# Patient Record
Sex: MALE | Employment: FULL TIME | URBAN - METROPOLITAN AREA
[De-identification: names, ages, dates, MRNs, and addresses within clinical notes are randomized per-mention and may not be internally consistent; named-entity substitution may affect disease eponyms.]

---

## 2017-04-24 ENCOUNTER — TELEPHONE (OUTPATIENT)
Dept: INTERNAL MEDICINE CLINIC | Facility: CLINIC | Age: 37
End: 2017-04-24

## 2017-04-24 ENCOUNTER — HOSPITAL ENCOUNTER (OUTPATIENT)
Dept: GENERAL RADIOLOGY | Facility: HOSPITAL | Age: 37
Discharge: HOME | End: 2017-04-24
Attending: INTERNAL MEDICINE
Payer: COMMERCIAL

## 2017-04-24 ENCOUNTER — OFFICE VISIT (OUTPATIENT)
Dept: INTERNAL MEDICINE CLINIC | Facility: CLINIC | Age: 37
End: 2017-04-24

## 2017-04-24 VITALS
OXYGEN SATURATION: 96 % | HEIGHT: 74 IN | SYSTOLIC BLOOD PRESSURE: 129 MMHG | DIASTOLIC BLOOD PRESSURE: 75 MMHG | TEMPERATURE: 98 F | HEART RATE: 68 BPM | BODY MASS INDEX: 35.68 KG/M2 | WEIGHT: 278 LBS

## 2017-04-24 DIAGNOSIS — R05.3 CHRONIC COUGH: ICD-10-CM

## 2017-04-24 DIAGNOSIS — R05.3 CHRONIC COUGH: Primary | ICD-10-CM

## 2017-04-24 PROCEDURE — 99212 OFFICE O/P EST SF 10 MIN: CPT | Performed by: INTERNAL MEDICINE

## 2017-04-24 PROCEDURE — 71020 XR CHEST PA + LAT CHEST (CPT=71020): CPT

## 2017-04-24 PROCEDURE — 99214 OFFICE O/P EST MOD 30 MIN: CPT | Performed by: INTERNAL MEDICINE

## 2017-04-24 NOTE — PROGRESS NOTES
HPI:    Patient ID: Lisseth Mann is a 43year old male. HPI  Cough  This is a chronic problem. Episode onset: 3 months. The problem has been unchanged. The cough is productive of sputum (clear). Associated symptoms include postnasal drip.  Pertinent nega and no frontal sinus tenderness. Mouth/Throat: Oropharynx is clear and moist. Uvula swelling present. No oropharyngeal exudate or posterior oropharyngeal erythema. Eyes: Conjunctivae are normal.   Neck: Normal range of motion.    Cardiovascular: Normal my name below, I, [address],  attest that this documentation has been prepared under the direction and in the presence of Aishwarya Lilly MD.   Electronically Signed: [address], 4/24/2017, 8:02 AM.    I, Aishwarya Lilly MD,  personally performed th

## 2017-04-25 RX ORDER — BENZONATATE 200 MG/1
200 CAPSULE ORAL 3 TIMES DAILY PRN
Qty: 30 CAPSULE | Refills: 2 | Status: SHIPPED | OUTPATIENT
Start: 2017-04-25 | End: 2021-12-01 | Stop reason: ALTCHOICE

## 2017-04-25 NOTE — TELEPHONE ENCOUNTER
Sent to the patient's pharmacy Tessalon pears (generic version). Call the patient and relay the information.  Thanks

## 2017-04-25 NOTE — TELEPHONE ENCOUNTER
Dr. Faisal Heck, please see message below. Thank you. Spoke to pt. Informed pt of Dr. Ramiro Mas message regarding chest x-ray. Pt is inquiring if rx can be sent to pharmacy to help with cough (pharmacy verified).  Informed message would be sent to Dr. Christy Almeida

## 2017-04-26 ENCOUNTER — HOSPITAL ENCOUNTER (OUTPATIENT)
Dept: RESPIRATORY THERAPY | Facility: HOSPITAL | Age: 37
Discharge: HOME | End: 2017-04-26
Attending: INTERNAL MEDICINE
Payer: COMMERCIAL

## 2017-04-26 DIAGNOSIS — R05.3 CHRONIC COUGH: ICD-10-CM

## 2017-04-26 PROCEDURE — 94060 EVALUATION OF WHEEZING: CPT | Performed by: INTERNAL MEDICINE

## 2017-04-26 PROCEDURE — 94726 PLETHYSMOGRAPHY LUNG VOLUMES: CPT | Performed by: INTERNAL MEDICINE

## 2017-04-26 PROCEDURE — 94729 DIFFUSING CAPACITY: CPT | Performed by: INTERNAL MEDICINE

## 2017-04-26 NOTE — TELEPHONE ENCOUNTER
Pt contacted. Informed that rx was sent to pharmacy. Verbalized understanding. No further questions.

## 2017-05-02 NOTE — H&P
Pulmonary Function Test     Samy Rodrigez Patient Status:  Outpatient    1980 MRN [id number]   Date of Exam  2017 PCP Barbra Ivory MD           Spirometry   FEV1:    3.45 L (  71 % )     FEV1/FVC:  78 % - WNL   No significant BDR     Lung V

## 2017-05-02 NOTE — ADDENDUM NOTE
Encounter addended by: Pola Rogers MD on: 5/1/2017  8:37 PM<BR>     Documentation filed: Charges VN, Notes Section

## 2021-12-01 ENCOUNTER — OFFICE VISIT (OUTPATIENT)
Dept: INTERNAL MEDICINE CLINIC | Facility: CLINIC | Age: 41
End: 2021-12-01
Payer: COMMERCIAL

## 2021-12-01 VITALS
DIASTOLIC BLOOD PRESSURE: 86 MMHG | OXYGEN SATURATION: 97 % | HEIGHT: 74 IN | HEART RATE: 64 BPM | SYSTOLIC BLOOD PRESSURE: 124 MMHG | BODY MASS INDEX: 38.22 KG/M2 | WEIGHT: 297.81 LBS

## 2021-12-01 DIAGNOSIS — E66.9 OBESITY (BMI 30-39.9): ICD-10-CM

## 2021-12-01 DIAGNOSIS — R68.82 LOW LIBIDO: ICD-10-CM

## 2021-12-01 DIAGNOSIS — F17.290 CIGAR SMOKER: ICD-10-CM

## 2021-12-01 DIAGNOSIS — Z00.00 HEALTH MAINTENANCE EXAMINATION: Primary | ICD-10-CM

## 2021-12-01 DIAGNOSIS — E78.5 DYSLIPIDEMIA: ICD-10-CM

## 2021-12-01 DIAGNOSIS — Z78.9 PATIENT IS JEHOVAH'S WITNESS: ICD-10-CM

## 2021-12-01 DIAGNOSIS — N52.9 ERECTILE DYSFUNCTION, UNSPECIFIED ERECTILE DYSFUNCTION TYPE: ICD-10-CM

## 2021-12-01 DIAGNOSIS — R20.0 NUMBNESS: ICD-10-CM

## 2021-12-01 PROBLEM — IMO0001 PATIENT IS JEHOVAH'S WITNESS: Status: ACTIVE | Noted: 2021-12-01

## 2021-12-01 PROCEDURE — 3074F SYST BP LT 130 MM HG: CPT | Performed by: FAMILY MEDICINE

## 2021-12-01 PROCEDURE — 99386 PREV VISIT NEW AGE 40-64: CPT | Performed by: FAMILY MEDICINE

## 2021-12-01 PROCEDURE — 99406 BEHAV CHNG SMOKING 3-10 MIN: CPT | Performed by: FAMILY MEDICINE

## 2021-12-01 PROCEDURE — 90715 TDAP VACCINE 7 YRS/> IM: CPT | Performed by: FAMILY MEDICINE

## 2021-12-01 PROCEDURE — 96127 BRIEF EMOTIONAL/BEHAV ASSMT: CPT | Performed by: FAMILY MEDICINE

## 2021-12-01 PROCEDURE — 3079F DIAST BP 80-89 MM HG: CPT | Performed by: FAMILY MEDICINE

## 2021-12-01 PROCEDURE — 90686 IIV4 VACC NO PRSV 0.5 ML IM: CPT | Performed by: FAMILY MEDICINE

## 2021-12-01 PROCEDURE — 3008F BODY MASS INDEX DOCD: CPT | Performed by: FAMILY MEDICINE

## 2021-12-01 PROCEDURE — 90471 IMMUNIZATION ADMIN: CPT | Performed by: FAMILY MEDICINE

## 2021-12-01 PROCEDURE — 90472 IMMUNIZATION ADMIN EACH ADD: CPT | Performed by: FAMILY MEDICINE

## 2021-12-01 RX ORDER — TADALAFIL 5 MG/1
5 TABLET ORAL
Qty: 10 TABLET | Refills: 0 | Status: SHIPPED | OUTPATIENT
Start: 2021-12-01

## 2021-12-01 NOTE — PATIENT INSTRUCTIONS
PATIENT INSTRUCTIONS    • Thank you for seeing me today, it was a pleasure taking care of you. • Please check out at the  and schedule a follow up appointment. Return in about 3 months (around 3/1/2022) for follow up.   • Please get your labs dr

## 2021-12-01 NOTE — ASSESSMENT & PLAN NOTE
Numbness in the first 3 digits of the hand in the region of the median nerve. Patient with persistent numbness. No significant Phalen's or Tinel's sign as patient with persistent numbness. EMG ordered. We will check B12, TSH levels.   Can consider refer

## 2021-12-01 NOTE — ASSESSMENT & PLAN NOTE
Erectile dysfunction with low libido. Advised weight loss with healthy diet and exercise. We will check testosterone levels. Cialis prescribed.

## 2021-12-01 NOTE — PROGRESS NOTES
FAMILY MEDICINE CLINIC NOTE    HPI  Lissteh Mann is a 41year old male presenting for physical    #Health Maintenance  -Diet: Bad - he eats everything.   -Exercise: No exercise  -Lung cancer screen: Not indicated  -Colon cancer screen: Not indicated  -Pros ACTIVE PROBLEM  Patient Active Problem List:     Patient is [employer]     Obesity (BMI 30-39. 9)     Dyslipidemia     Numbness     Health maintenance examination     Erectile dysfunction     Cigar smoker     Low libido      PAST MEDICAL HISTOR surgical history on file.     PAST FAMILY HISTORY  Family History   Problem Relation Age of Onset   • Traumatic brain injury Father    • Breast Cancer Mother    • No Known Problems Brother    • No Known Problems Maternal Grandmother          in 46s   • DIAGNOSTICS    ASSESSMENT/PLAN  Problem List Items Addressed This Visit        Respiratory    Cigar smoker     Advised cigar smoking cessation.          Relevant Orders    BEHAV CHNG SMOKING GR THAN 3 UP TO 10 MIN       Neurologic    Numbness     Numbne MD  Family Medicine              Tobacco Cessation Documentation (Smoking and Smokeless included):    I had an in depth therapy session with Shruti Almaguer about his tobacco use risks and options using the [facility]'s Five A's approach:    Ask: Rosibel Conn is using

## 2021-12-01 NOTE — PROGRESS NOTES
Pt presented to clinic today for blood draw. Per physician able to draw orders. Orders  documented within chart. Pt tolerated lab draw well.  verified.   Orders drawn include:   CBC WITH DIFFERENTIAL WITH PLATELET   COMP METABOLIC PANEL (14)   HEMOGLOBIN

## 2021-12-04 ENCOUNTER — NURSE ONLY (OUTPATIENT)
Dept: INTERNAL MEDICINE CLINIC | Facility: CLINIC | Age: 41
End: 2021-12-04
Payer: COMMERCIAL

## 2021-12-04 DIAGNOSIS — R68.82 LOW LIBIDO: ICD-10-CM

## 2021-12-04 DIAGNOSIS — N52.9 ERECTILE DYSFUNCTION, UNSPECIFIED ERECTILE DYSFUNCTION TYPE: ICD-10-CM

## 2021-12-08 PROBLEM — E78.2 MIXED HYPERLIPIDEMIA: Status: ACTIVE | Noted: 2021-12-01

## 2025-06-10 ENCOUNTER — OFFICE VISIT (OUTPATIENT)
Dept: INTERNAL MEDICINE CLINIC | Facility: CLINIC | Age: 45
End: 2025-06-10

## 2025-06-10 ENCOUNTER — HOSPITAL ENCOUNTER (OUTPATIENT)
Dept: GENERAL RADIOLOGY | Facility: HOSPITAL | Age: 45
Discharge: HOME | End: 2025-06-10
Attending: INTERNAL MEDICINE
Payer: COMMERCIAL

## 2025-06-10 ENCOUNTER — LAB ENCOUNTER (OUTPATIENT)
Dept: LAB | Age: 45
End: 2025-06-10
Attending: INTERNAL MEDICINE
Payer: COMMERCIAL

## 2025-06-10 VITALS
SYSTOLIC BLOOD PRESSURE: 126 MMHG | WEIGHT: 295.63 LBS | BODY MASS INDEX: 38.35 KG/M2 | OXYGEN SATURATION: 96 % | RESPIRATION RATE: 20 BRPM | DIASTOLIC BLOOD PRESSURE: 82 MMHG | HEIGHT: 73.5 IN | TEMPERATURE: 98 F | HEART RATE: 76 BPM

## 2025-06-10 DIAGNOSIS — E78.00 PURE HYPERCHOLESTEROLEMIA: ICD-10-CM

## 2025-06-10 DIAGNOSIS — Z00.00 ANNUAL PHYSICAL EXAM: Primary | ICD-10-CM

## 2025-06-10 DIAGNOSIS — N52.9 ERECTILE DYSFUNCTION, UNSPECIFIED ERECTILE DYSFUNCTION TYPE: ICD-10-CM

## 2025-06-10 DIAGNOSIS — M25.512 CHRONIC LEFT SHOULDER PAIN: ICD-10-CM

## 2025-06-10 DIAGNOSIS — G89.29 CHRONIC LEFT SHOULDER PAIN: ICD-10-CM

## 2025-06-10 DIAGNOSIS — Z12.11 COLON CANCER SCREENING: ICD-10-CM

## 2025-06-10 DIAGNOSIS — R05.3 CHRONIC COUGH: ICD-10-CM

## 2025-06-10 DIAGNOSIS — E66.09 CLASS 2 OBESITY DUE TO EXCESS CALORIES WITHOUT SERIOUS COMORBIDITY WITH BODY MASS INDEX (BMI) OF 38.0 TO 38.9 IN ADULT: ICD-10-CM

## 2025-06-10 DIAGNOSIS — E66.812 CLASS 2 OBESITY DUE TO EXCESS CALORIES WITHOUT SERIOUS COMORBIDITY WITH BODY MASS INDEX (BMI) OF 38.0 TO 38.9 IN ADULT: ICD-10-CM

## 2025-06-10 DIAGNOSIS — Z80.0 FAMILY HISTORY OF COLON CANCER: ICD-10-CM

## 2025-06-10 DIAGNOSIS — Z00.00 ANNUAL PHYSICAL EXAM: ICD-10-CM

## 2025-06-10 DIAGNOSIS — R20.0 NUMBNESS AND TINGLING IN LEFT HAND: ICD-10-CM

## 2025-06-10 DIAGNOSIS — R20.2 NUMBNESS AND TINGLING IN LEFT HAND: ICD-10-CM

## 2025-06-10 PROBLEM — R68.82 LOW LIBIDO: Status: RESOLVED | Noted: 2021-12-01 | Resolved: 2025-06-10

## 2025-06-10 PROBLEM — E66.9 OBESITY (BMI 30-39.9): Status: RESOLVED | Noted: 2021-12-01 | Resolved: 2025-06-10

## 2025-06-10 PROBLEM — E78.2 MIXED HYPERLIPIDEMIA: Status: RESOLVED | Noted: 2021-12-01 | Resolved: 2025-06-10

## 2025-06-10 LAB
ALBUMIN SERPL-MCNC: 4.7 G/DL (ref 3.2–4.8)
ALBUMIN/GLOB SERPL: 1.6 {RATIO} (ref 1–2)
ALP LIVER SERPL-CCNC: 86 U/L (ref 45–117)
ALT SERPL-CCNC: 25 U/L (ref 10–49)
ANION GAP SERPL CALC-SCNC: 9 MMOL/L (ref 0–18)
AST SERPL-CCNC: 24 U/L (ref ?–34)
BASOPHILS # BLD AUTO: 0.06 X10(3) UL (ref 0–0.2)
BASOPHILS NFR BLD AUTO: 0.6 %
BILIRUB SERPL-MCNC: 0.8 MG/DL (ref 0.3–1.2)
BUN BLD-MCNC: 15 MG/DL (ref 9–23)
BUN/CREAT SERPL: 14.7 (ref 10–20)
CALCIUM BLD-MCNC: 9.7 MG/DL (ref 8.7–10.4)
CHLORIDE SERPL-SCNC: 100 MMOL/L (ref 98–112)
CHOLEST SERPL-MCNC: 233 MG/DL (ref ?–200)
CO2 SERPL-SCNC: 29 MMOL/L (ref 21–32)
CREAT BLD-MCNC: 1.02 MG/DL (ref 0.7–1.3)
DEPRECATED RDW RBC AUTO: 39.4 FL (ref 35.1–46.3)
EGFRCR SERPLBLD CKD-EPI 2021: 92 ML/MIN/1.73M2 (ref 60–?)
EOSINOPHIL # BLD AUTO: 0.19 X10(3) UL (ref 0–0.7)
EOSINOPHIL NFR BLD AUTO: 1.9 %
ERYTHROCYTE [DISTWIDTH] IN BLOOD BY AUTOMATED COUNT: 11.9 % (ref 11–15)
EST. AVERAGE GLUCOSE BLD GHB EST-MCNC: 117 MG/DL (ref 68–126)
FASTING PATIENT LIPID ANSWER: NO
FASTING STATUS PATIENT QL REPORTED: NO
GLOBULIN PLAS-MCNC: 3 G/DL (ref 2–3.5)
GLUCOSE BLD-MCNC: 83 MG/DL (ref 70–99)
HBA1C MFR BLD: 5.7 % (ref ?–5.7)
HCT VFR BLD AUTO: 41.2 % (ref 39–53)
HDLC SERPL-MCNC: 47 MG/DL (ref 40–59)
HGB BLD-MCNC: 13.6 G/DL (ref 13–17.5)
IMM GRANULOCYTES # BLD AUTO: 0.04 X10(3) UL (ref 0–1)
IMM GRANULOCYTES NFR BLD: 0.4 %
LDLC SERPL CALC-MCNC: 153 MG/DL (ref ?–100)
LYMPHOCYTES # BLD AUTO: 1.84 X10(3) UL (ref 1–4)
LYMPHOCYTES NFR BLD AUTO: 18.4 %
MCH RBC QN AUTO: 29.6 PG (ref 26–34)
MCHC RBC AUTO-ENTMCNC: 33 G/DL (ref 31–37)
MCV RBC AUTO: 89.8 FL (ref 80–100)
MONOCYTES # BLD AUTO: 0.84 X10(3) UL (ref 0.1–1)
MONOCYTES NFR BLD AUTO: 8.4 %
NEUTROPHILS # BLD AUTO: 7.01 X10 (3) UL (ref 1.5–7.7)
NEUTROPHILS # BLD AUTO: 7.01 X10(3) UL (ref 1.5–7.7)
NEUTROPHILS NFR BLD AUTO: 70.3 %
NONHDLC SERPL-MCNC: 186 MG/DL (ref ?–130)
OSMOLALITY SERPL CALC.SUM OF ELEC: 286 MOSM/KG (ref 275–295)
PLATELET # BLD AUTO: 228 10(3)UL (ref 150–450)
POTASSIUM SERPL-SCNC: 3.7 MMOL/L (ref 3.5–5.1)
PROT SERPL-MCNC: 7.7 G/DL (ref 5.7–8.2)
RBC # BLD AUTO: 4.59 X10(6)UL (ref 4.3–5.7)
SODIUM SERPL-SCNC: 138 MMOL/L (ref 136–145)
TRIGL SERPL-MCNC: 182 MG/DL (ref 30–149)
TSI SER-ACNC: 0.77 UIU/ML (ref 0.55–4.78)
VLDLC SERPL CALC-MCNC: 35 MG/DL (ref 0–30)
WBC # BLD AUTO: 10 X10(3) UL (ref 4–11)

## 2025-06-10 PROCEDURE — 36415 COLL VENOUS BLD VENIPUNCTURE: CPT

## 2025-06-10 PROCEDURE — 84443 ASSAY THYROID STIM HORMONE: CPT

## 2025-06-10 PROCEDURE — 80053 COMPREHEN METABOLIC PANEL: CPT

## 2025-06-10 PROCEDURE — 85025 COMPLETE CBC W/AUTO DIFF WBC: CPT

## 2025-06-10 PROCEDURE — 83036 HEMOGLOBIN GLYCOSYLATED A1C: CPT

## 2025-06-10 PROCEDURE — 80061 LIPID PANEL: CPT

## 2025-06-10 PROCEDURE — 73030 X-RAY EXAM OF SHOULDER: CPT | Performed by: INTERNAL MEDICINE

## 2025-06-10 PROCEDURE — 71046 X-RAY EXAM CHEST 2 VIEWS: CPT | Performed by: INTERNAL MEDICINE

## 2025-06-10 RX ORDER — TADALAFIL 5 MG/1
5 TABLET ORAL
Qty: 10 TABLET | Refills: 0 | Status: SHIPPED | OUTPATIENT
Start: 2025-06-10

## 2025-06-10 RX ORDER — OMEPRAZOLE 20 MG/1
20 CAPSULE, DELAYED RELEASE ORAL
Qty: 14 CAPSULE | Refills: 0 | Status: SHIPPED | OUTPATIENT
Start: 2025-06-10

## 2025-06-10 NOTE — PROGRESS NOTES
The following individual(s) verbally consented to be recorded using ambient AI listening technology and understand that they can each withdraw their consent to this listening technology at any point by asking the clinician to turn off or pause the recording:    Patient name: Terrell Valladares  Additional names:  n/a

## 2025-06-10 NOTE — PATIENT INSTRUCTIONS
Prevention Guidelines, Men Ages 40 to 49  Screening tests and vaccines are an important part of managing your health. A screening test is done to find possible disorders or diseases in people who don't have any symptoms. The goal is to find a disease early so lifestyle changes can be made and you can be watched more closely to reduce the risk of disease, or to detect it early enough to treat it most effectively. Screening tests are not considered diagnostic, but are used to determine if more testing is needed. Health counseling is essential, too. Below are guidelines for these, for men ages 40 to 49. Talk with your healthcare provider to make sure you’re up to date on what you need.  Screening Who needs it How often   Alcohol misuse All men in this age group At routine exams   Blood pressure All men in this age group Yearly checkup if your blood pressure reading is normal  Normal blood pressure is less than 120/80 mm Hg  If your blood pressure is higher than normal, follow the advice of your healthcare provider      Depression All men in this age group At routine exams   Type 2 diabetes or prediabetes All men beginning at age 45 and men  without symptoms at any age who are overweight or obese and have 1 or more other risk factors for diabetes At least every 3 years (yearly if blood sugar has begun to rise)   Type 2 diabetes All men with prediabetes Every year   Hepatitis C Men at increased risk for infection - talk with your healthcare provider At routine exams   High cholesterol or triglycerides All men ages 35 and older, and younger men at high risk for coronary artery disease At least every 5 years   HIV All men At routine exams   Obesity All men in this age group At routine exams   Prostate cancer Starting at age 45, talk to healthcare provider about risks and benefits of digital rectal exam (SERAFIN) and prostate-specific antigen (PSA) screening1 At routine exams   Syphilis Men at increased risk for infection -  talk with your healthcare provider At routine exams   Tuberculosis Men at increased risk for infection - talk with your healthcare provider Check with your healthcare provider   Vision All men in this age group Every 2 to 4 years if no risk factors for eye disease2   Vaccine Who needs it How often   Chickenpox (varicella) All men in this age group who have no record of this infection or vaccine 2 doses; the second dose should be given at least 4 weeks after the first dose   Hepatitis A Men at increased risk for infection - talk with your healthcare provider 2 doses given at least 6 months apart   Hepatitis B Men at increased risk for infection - talk with your healthcare provider 3 doses over 6 months; second dose should be given 1 month after the first dose; the third dose should be given at least 2 months after the second dose and at least 4 months after the first dose   Haemophilus influenzae Type B (HIB) Men at increased risk for infection - talk with your healthcare provider 1 to 3 doses   Influenza (flu) All men in this age group Once a year   Measles, mumps, rubella (MMR) All men in this age group who have no record of these infections or vaccines 1 or 2 doses   Meningococcal Men at increased risk for infection - talk with your healthcare provider 1 or more doses   Pneumococcal conjugate vaccine (PCV13) and pneumococcal polysaccharide vaccine (PPSV23) Men at increased risk for infection - talk with your healthcare provider PCV13: 1 dose ages 19 to 65 (protects against 13 types of pneumococcal bacteria)     PPSV23: 1 to 2 doses through age 64, or 1 dose at 65 or older (protects against 23 types of pneumococcal bacteria)      Tetanus/diphtheria/  pertussis (Td/Tdap) booster All men in this age group Td every 10 years, or a one-time dose of Tdap instead of a Td booster after age 18, then Td every 10 years   Counseling Who needs it How often   Diet and exercise Men who are overweight or obese When diagnosed, and  then at routine exams   Sexually transmitted infection prevention Men at increased risk for infection - talk with your healthcare provider At routine exams   Use of daily aspirin Men ages 45 to 79 at risk for cardiovascular health problems At routine exams   Use of tobacco and the health effects it can cause All men in this age group Every exam   [address] [place] [address] [facility]   [facility] Academy of Ophthalmology  Date Last Reviewed: 2/1/2017  © 3784-5606 [employer]. [address] [place] [address], [place] [address]. All rights reserved. This information is not intended as a substitute for professional medical care. Always follow your healthcare professional's instructions.

## 2025-06-10 NOTE — PROGRESS NOTES
Patient ID: Terrell Valladares is a 44 year old male.  Chief Complaint   Patient presents with    Physical    Shoulder Pain     Left shoulder pain    Numbness     Numbness in left hand and fingers        HISTORY OF PRESENT ILLNESS:   HPI  History of Present Illness  Terrell Valladares is a 44 year old male who presents for an annual physical exam and evaluation of left shoulder pain.    He has a history of left shoulder pain attributed to old wrestling injuries, which worsened about a month ago after waking up with increased discomfort. He is left-handed and has not had any prior workup, x-rays, or physical therapy for this issue.    He experiences numbness and tingling in his left arm, specifically affecting the first three fingers, which he describes as a radial nerve palsy. This symptom has been present for about two to three years, initially triggered by a wrestling injury where he experienced a stinger. The numbness and tingling are constant throughout the day, and he has not sought any prior treatment for this condition.    He has a persistent cough that has been ongoing for about a year, possibly since COVID. The cough is more pronounced in the mornings and nights and worsens in cold air. He denies any history of smoking cigarettes but smokes cigars occasionally, about three to four times a year. No acid reflux symptoms are reported.  However, he does eat a very spicy and fatty diet.    He has a history of high cholesterol noted in 2015, but he is unaware of any recent lab work to reassess this condition. He denies any history of diabetes, although his brother was recently diagnosed with diabetes.     He has a BMI of 38 and wants to explore weight loss options. He is currently not on any specific weight loss regimen.    He has used Cialis in the past for erectile dysfunction, which he found effective, but he has not used it recently.    Never had a colonoscopy.  Father's brother was diagnosed with colon cancer around  age 60.    Urgency yearly.  No recreational drugs.  Consumes 2-3 alcoholic beverages weekly.  Works as a .  He is about to be  and has 1 child.  No other family history of premature cardiac diseases or cancer.        Review of Systems  Ten point review of systems otherwise negative with the exception of HPI and assessment and plan.    MEDICAL HISTORY:     Past Medical History[1]    Past Surgical History[2]    Medications - Current[3]    Allergies:Allergies[4]    Social History     Socioeconomic History    Marital status:      Spouse name: Not on file    Number of children: Not on file    Years of education: Not on file    Highest education level: Not on file   Occupational History    Not on file   Tobacco Use    Smoking status: Never     Passive exposure: Never    Smokeless tobacco: Never   Vaping Use    Vaping status: Never Used   Substance and Sexual Activity    Alcohol use: Yes     Comment: occasionally weekends - 10-15 drinks    Drug use: Never    Sexual activity: Yes     Partners: Female     Birth control/protection: Hysterectomy   Other Topics Concern    Not on file   Social History Narrative    Relationships:  - Conicka    Children: Davonte - 4 y/o M    Pets: Cat    School: N/A    Work: Currently working for [employer] (flavors for food, drinks etc). 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 [facility]    Interests: Enjoys going out with friends - enjoys going to see games (combat sports), drinking. Pro wrestling.    Spiritual: Quaker. Does not desire blood transfusion products.      Social Drivers of Health     Food Insecurity: No Food Insecurity (6/10/2025)    NCSS - Food Insecurity     Worried About Running Out of Food in the Last Year: No     Ran Out of Food in the Last Year: No   Transportation Needs: No Transportation Needs (6/10/2025)    NCSS - Transportation      Lack of Transportation: No   Stress: Not on file   Housing Stability: At Risk (6/10/2025)    NCSS - Housing/Utilities     Has Housing: Yes     Worried About Losing Housing: Yes     Unable to Get Utilities: No           PHYSICAL EXAM:     Vitals:    06/10/25 1452   BP: 126/82   Pulse: 76   Resp: 20   Temp: 98 °F (36.7 °C)   TempSrc: Temporal   SpO2: 96%   Weight: 295 lb 9.6 oz (134.1 kg)   Height: 6' 1.5\" (1.867 m)       Body mass index is 38.47 kg/m².    Physical Exam  Constitutional:       Appearance: Normal appearance. He is well-developed.   HENT:      Head: Normocephalic.      Right Ear: Tympanic membrane, ear canal and external ear normal. There is no impacted cerumen.      Left Ear: Tympanic membrane, ear canal and external ear normal. There is no impacted cerumen.   Eyes:      General: No scleral icterus.     Pupils: Pupils are equal, round, and reactive to light.   Neck:      Vascular: No JVD.   Cardiovascular:      Rate and Rhythm: Normal rate and regular rhythm.      Pulses: Normal pulses.      Heart sounds: Normal heart sounds. No murmur heard.  Pulmonary:      Effort: Pulmonary effort is normal. No respiratory distress.      Breath sounds: Normal breath sounds. No stridor. No wheezing, rhonchi or rales.   Chest:      Chest wall: No tenderness.   Abdominal:      General: Abdomen is flat. Bowel sounds are normal. There is no distension.      Palpations: Abdomen is soft.      Tenderness: There is no abdominal tenderness. There is no guarding or rebound.   Musculoskeletal:      Left shoulder: Tenderness present. Decreased range of motion.      Cervical back: Normal range of motion.   Lymphadenopathy:      Cervical: No cervical adenopathy.   Skin:     General: Skin is warm.   Neurological:      General: No focal deficit present.      Mental Status: He is alert.      Cranial Nerves: No cranial nerve deficit.   Psychiatric:         Mood and Affect: Mood normal.         Behavior: Behavior normal.        ASSESSMENT/PLAN:   1. Annual physical exam  CBC With Differential With Platelet; Future  Comp Metabolic Panel (14); Future  Lipid Panel; Future  TSH W Reflex To Free T4; Future    2. Pure hypercholesterolemia  Comp Metabolic Panel (14); Future  Lipid Panel; Future    3. Erectile dysfunction, unspecified erectile dysfunction type  tadalafil 5 MG Oral Tab; Take 1 tablet (5 mg total) by mouth daily as needed for Erectile Dysfunction.  Dispense: 10 tablet; Refill: 0  Hemoglobin A1C [E]; Future    4. Class 2 obesity due to excess calories without serious comorbidity with body mass index (BMI) of 38.0 to 38.9 in adult  [facility] - Dr. Nikolay Larson [facility] 9    5. Chronic left shoulder pain  XR SHOULDER, COMPLETE (MIN 2 VIEWS), LEFT (CPT=73030); Future  Physical Therapy Referral - [facility]    6. Chronic cough  XR CHEST PA + LAT CHEST (CPT=71046); Future  omeprazole 20 MG Oral Capsule Delayed Release; Take 1 capsule (20 mg total) by mouth every morning before breakfast.  Dispense: 14 capsule; Refill: 0  Minimize spicy/fatty/acidic foods.    7. Numbness and tingling in left hand  EMG (At [facility]); Future    8. Family history of colon cancer  GASTRO - INTERNAL    9. Colon cancer screening  GASTRO - INTERNAL    Return in about 6 months (around 12/10/2025) for Routine Follow-Up.    This note was prepared using Dragon Medical voice recognition dictation software. As a result errors may occur. When identified these errors have been corrected. While every attempt is made to correct errors during dictation discrepancies may still exist.    Kenny Husain MD  6/10/2025       [1]   Past Medical History:   Erectile dysfunction    Mixed hyperlipidemia    Patient is Mu-ism    Does not desire blood transfusions.    [2] History reviewed. No pertinent surgical history.  [3]   Current Outpatient Medications:     tadalafil 5 MG Oral Tab, Take 1 tablet (5 mg  total) by mouth daily as needed for Erectile Dysfunction., Disp: 10 tablet, Rfl: 0    omeprazole 20 MG Oral Capsule Delayed Release, Take 1 capsule (20 mg total) by mouth every morning before breakfast., Disp: 14 capsule, Rfl: 0  [4]   Allergies  Allergen Reactions    Ibuprofen     Pcn [Penicillins]

## 2025-07-21 DIAGNOSIS — R05.3 CHRONIC COUGH: ICD-10-CM

## 2025-07-23 RX ORDER — OMEPRAZOLE 20 MG/1
20 CAPSULE, DELAYED RELEASE ORAL
Qty: 14 CAPSULE | Refills: 0 | OUTPATIENT
Start: 2025-07-23

## (undated) NOTE — MR AVS SNAPSHOT
[place]  [address]  [phone]               Thank you for choosing us for your health care visit with Shruthi Bonilla MD.  We are glad to serve you and happy to provide you with this summary Sign up for [employer], your secure online medical record. Diveboard will allow you to access patient instructions from your recent visit,  view other health information, and more. To sign up or find more information, go to [email or web address]. org and cl increments are effective and add up over the week   2 ½ hours per week – spread out over time Use a mohini to keep you motivated   Don’t forget strength training with weights and resistance Set goals and track your progress   You don’t need to join a gym.